# Patient Record
Sex: FEMALE | Race: WHITE | NOT HISPANIC OR LATINO | URBAN - METROPOLITAN AREA
[De-identification: names, ages, dates, MRNs, and addresses within clinical notes are randomized per-mention and may not be internally consistent; named-entity substitution may affect disease eponyms.]

---

## 2017-01-07 ENCOUNTER — OUTPATIENT (OUTPATIENT)
Dept: OUTPATIENT SERVICES | Facility: HOSPITAL | Age: 57
LOS: 1 days | Discharge: HOME | End: 2017-01-07

## 2017-06-27 DIAGNOSIS — M25.572 PAIN IN LEFT ANKLE AND JOINTS OF LEFT FOOT: ICD-10-CM

## 2018-12-15 ENCOUNTER — OUTPATIENT (OUTPATIENT)
Dept: OUTPATIENT SERVICES | Facility: HOSPITAL | Age: 58
LOS: 1 days | Discharge: HOME | End: 2018-12-15

## 2018-12-17 DIAGNOSIS — M81.0 AGE-RELATED OSTEOPOROSIS WITHOUT CURRENT PATHOLOGICAL FRACTURE: ICD-10-CM

## 2018-12-17 DIAGNOSIS — Z13.820 ENCOUNTER FOR SCREENING FOR OSTEOPOROSIS: ICD-10-CM

## 2018-12-17 DIAGNOSIS — Z78.0 ASYMPTOMATIC MENOPAUSAL STATE: ICD-10-CM

## 2019-01-08 ENCOUNTER — INPATIENT (INPATIENT)
Facility: HOSPITAL | Age: 59
LOS: 2 days | Discharge: HOME | End: 2019-01-11
Attending: INTERNAL MEDICINE | Admitting: INTERNAL MEDICINE

## 2019-01-08 VITALS
TEMPERATURE: 97 F | DIASTOLIC BLOOD PRESSURE: 69 MMHG | SYSTOLIC BLOOD PRESSURE: 113 MMHG | HEART RATE: 67 BPM | RESPIRATION RATE: 20 BRPM | OXYGEN SATURATION: 100 %

## 2019-01-08 LAB
ALBUMIN SERPL ELPH-MCNC: 4 G/DL — SIGNIFICANT CHANGE UP (ref 3.5–5.2)
ALP SERPL-CCNC: 248 U/L — HIGH (ref 30–115)
ALT FLD-CCNC: 840 U/L — HIGH (ref 0–41)
ANION GAP SERPL CALC-SCNC: 8 MMOL/L — SIGNIFICANT CHANGE UP (ref 7–14)
APPEARANCE UR: CLEAR — SIGNIFICANT CHANGE UP
AST SERPL-CCNC: 202 U/L — HIGH (ref 0–41)
BACTERIA # UR AUTO: SIGNIFICANT CHANGE UP /HPF
BASOPHILS # BLD AUTO: 0.01 K/UL — SIGNIFICANT CHANGE UP (ref 0–0.2)
BASOPHILS NFR BLD AUTO: 0.3 % — SIGNIFICANT CHANGE UP (ref 0–1)
BILIRUB SERPL-MCNC: 0.6 MG/DL — SIGNIFICANT CHANGE UP (ref 0.2–1.2)
BILIRUB UR-MCNC: ABNORMAL
BUN SERPL-MCNC: 16 MG/DL — SIGNIFICANT CHANGE UP (ref 10–20)
CALCIUM SERPL-MCNC: 9.4 MG/DL — SIGNIFICANT CHANGE UP (ref 8.5–10.1)
CHLORIDE SERPL-SCNC: 105 MMOL/L — SIGNIFICANT CHANGE UP (ref 98–110)
CO2 SERPL-SCNC: 28 MMOL/L — SIGNIFICANT CHANGE UP (ref 17–32)
COD CRY URNS QL: ABNORMAL
COLOR SPEC: YELLOW — SIGNIFICANT CHANGE UP
CREAT SERPL-MCNC: 0.8 MG/DL — SIGNIFICANT CHANGE UP (ref 0.7–1.5)
DIFF PNL FLD: ABNORMAL
EOSINOPHIL # BLD AUTO: 0.07 K/UL — SIGNIFICANT CHANGE UP (ref 0–0.7)
EOSINOPHIL NFR BLD AUTO: 2 % — SIGNIFICANT CHANGE UP (ref 0–8)
EPI CELLS # UR: ABNORMAL /HPF
ETHANOL SERPL-MCNC: <10 MG/DL — HIGH
GLUCOSE SERPL-MCNC: 102 MG/DL — HIGH (ref 70–99)
GLUCOSE UR QL: NEGATIVE MG/DL — SIGNIFICANT CHANGE UP
HCT VFR BLD CALC: 37.7 % — SIGNIFICANT CHANGE UP (ref 37–47)
HGB BLD-MCNC: 12.5 G/DL — SIGNIFICANT CHANGE UP (ref 12–16)
IMM GRANULOCYTES NFR BLD AUTO: 0.3 % — SIGNIFICANT CHANGE UP (ref 0.1–0.3)
KETONES UR-MCNC: NEGATIVE — SIGNIFICANT CHANGE UP
LACTATE SERPL-SCNC: 1 MMOL/L — SIGNIFICANT CHANGE UP (ref 0.5–2.2)
LEUKOCYTE ESTERASE UR-ACNC: ABNORMAL
LIDOCAIN IGE QN: 27 U/L — SIGNIFICANT CHANGE UP (ref 7–60)
LYMPHOCYTES # BLD AUTO: 0.99 K/UL — LOW (ref 1.2–3.4)
LYMPHOCYTES # BLD AUTO: 28.5 % — SIGNIFICANT CHANGE UP (ref 20.5–51.1)
MCHC RBC-ENTMCNC: 29.7 PG — SIGNIFICANT CHANGE UP (ref 27–31)
MCHC RBC-ENTMCNC: 33.2 G/DL — SIGNIFICANT CHANGE UP (ref 32–37)
MCV RBC AUTO: 89.5 FL — SIGNIFICANT CHANGE UP (ref 81–99)
MONOCYTES # BLD AUTO: 0.35 K/UL — SIGNIFICANT CHANGE UP (ref 0.1–0.6)
MONOCYTES NFR BLD AUTO: 10.1 % — HIGH (ref 1.7–9.3)
NEUTROPHILS # BLD AUTO: 2.04 K/UL — SIGNIFICANT CHANGE UP (ref 1.4–6.5)
NEUTROPHILS NFR BLD AUTO: 58.8 % — SIGNIFICANT CHANGE UP (ref 42.2–75.2)
NITRITE UR-MCNC: NEGATIVE — SIGNIFICANT CHANGE UP
NRBC # BLD: 0 /100 WBCS — SIGNIFICANT CHANGE UP (ref 0–0)
PH UR: 5.5 — SIGNIFICANT CHANGE UP (ref 5–8)
PLATELET # BLD AUTO: 176 K/UL — SIGNIFICANT CHANGE UP (ref 130–400)
POTASSIUM SERPL-MCNC: 4.3 MMOL/L — SIGNIFICANT CHANGE UP (ref 3.5–5)
POTASSIUM SERPL-SCNC: 4.3 MMOL/L — SIGNIFICANT CHANGE UP (ref 3.5–5)
PROT SERPL-MCNC: 7.2 G/DL — SIGNIFICANT CHANGE UP (ref 6–8)
PROT UR-MCNC: ABNORMAL MG/DL
RBC # BLD: 4.21 M/UL — SIGNIFICANT CHANGE UP (ref 4.2–5.4)
RBC # FLD: 13.2 % — SIGNIFICANT CHANGE UP (ref 11.5–14.5)
RBC CASTS # UR COMP ASSIST: ABNORMAL /HPF
SODIUM SERPL-SCNC: 141 MMOL/L — SIGNIFICANT CHANGE UP (ref 135–146)
SP GR SPEC: >=1.03 (ref 1.01–1.03)
UROBILINOGEN FLD QL: 1 MG/DL (ref 0.2–0.2)
WBC # BLD: 3.47 K/UL — LOW (ref 4.8–10.8)
WBC # FLD AUTO: 3.47 K/UL — LOW (ref 4.8–10.8)
WBC UR QL: ABNORMAL /HPF

## 2019-01-08 NOTE — ED PROVIDER NOTE - OBJECTIVE STATEMENT
This is a 58yF p/w epigastric pain x 1wk.  Pain is epigastric, radiating to her back, associated with nausea and 1 day of vomiting (when other people were sick as well after eating food on a boating trip). Pain started New Year's Niurka in the setting of heavier than usual alcohol intake over the holidays. No fevers but +chills.  No dysuria.  No hx gallstones.    PMH: chronic back pain  Meds: denies  NKDA  SH: +EtOH, nonsmoker

## 2019-01-08 NOTE — ED PROVIDER NOTE - PHYSICAL EXAMINATION
CONSTITUTIONAL: well developed; well nourished; well appearing in no acute distress  HEAD: normocephalic; atraumatic  EYES: no conjunctival injection, no scleral icterus  ENT: no nasal discharge; airway clear.  NECK: supple; non tender. + full passive ROM in all directions  CARD: S1, S2 normal; no murmurs, gallops, or rubs. Regular rate and rhythm  RESP: no wheezes, rales or rhonchi. Good air movement bilaterally without significant accessory muscle use  ABD: soft; non-distended; epigastric tenderness, no RUQ tenderness, no rebound, no guarding, no pulsatile abdominal mass  EXT: moving all extremities spontaneously, normal ROM. No clubbing, cyanosis or edema  SKIN: warm and dry, no lesions noted  NEURO: alert, oriented, CN II-XII grossly intact,motor and sensory grossly intact, speech nonslurred, no focal deficits. GCS 15  PSYCH: calm, cooperative, appropriate, good eye contact, logical thought process, no apparent danger to self or others

## 2019-01-08 NOTE — ED PROVIDER NOTE - MEDICAL DECISION MAKING DETAILS
58yF no sig PMH +EtOH p/w epigastric pain x1wk, sent to the ED for ? pancreatitis.  Labs with no leukocytosis, normal electrolytes and renal function, normal lipase, non-elevated lactate but  and  with normal bilirubin.  CT A/P w/o pancreatic pathology. RUQ US w/ cholelithiasis only.  Transaminitis not in alcoholic or cholestatic pattern, unclear etiology at this time.  Given less likely alcoholic hepatitis at this time, will not start steroids or pentoxifyline.  Pt hemodynamically stable, comfortable, to be admitted for hepatitis for further workup.

## 2019-01-08 NOTE — ED PROVIDER NOTE - NS ED ROS FT
Constitutional: no fevers/chills, no sick contacts  Eyes: No visual changes, eye pain or discharge. No photophobia  ENMT: No hearing changes, pain, discharge or infections. No sore throat or drooling.  Neck:  No neck pain or stiffness. No limited ROM  Cardiac: No SOB or edema. No chest pain with exertion.  Respiratory: No cough or respiratory distress. No hemoptysis. No history of asthma or RAD  GI: + nausea, vomiting, no diarrhea, +abdominal pain  : No dysuria, frequency or burning. No discharge  MS: No myalgia, muscle weakness, joint pain or back pain  Neuro: No headache or weakness. No LOC  Skin: No skin rash  Endo: no diabetes or thyroid dysfunction  Heme: no abnormal bleeding or clotting  Except as documented in the HPI, all other systems are negative

## 2019-01-09 RX ORDER — SODIUM CHLORIDE 9 MG/ML
1000 INJECTION, SOLUTION INTRAVENOUS ONCE
Qty: 0 | Refills: 0 | Status: COMPLETED | OUTPATIENT
Start: 2019-01-09 | End: 2019-01-09

## 2019-01-09 RX ORDER — PANTOPRAZOLE SODIUM 20 MG/1
40 TABLET, DELAYED RELEASE ORAL DAILY
Qty: 0 | Refills: 0 | Status: DISCONTINUED | OUTPATIENT
Start: 2019-01-09 | End: 2019-01-11

## 2019-01-09 RX ORDER — OXYCODONE HYDROCHLORIDE 5 MG/1
5 TABLET ORAL
Qty: 0 | Refills: 0 | Status: DISCONTINUED | OUTPATIENT
Start: 2019-01-09 | End: 2019-01-11

## 2019-01-09 RX ORDER — ACETAMINOPHEN 500 MG
650 TABLET ORAL EVERY 6 HOURS
Qty: 0 | Refills: 0 | Status: DISCONTINUED | OUTPATIENT
Start: 2019-01-09 | End: 2019-01-11

## 2019-01-09 RX ORDER — HEPARIN SODIUM 5000 [USP'U]/ML
5000 INJECTION INTRAVENOUS; SUBCUTANEOUS EVERY 8 HOURS
Qty: 0 | Refills: 0 | Status: DISCONTINUED | OUTPATIENT
Start: 2019-01-09 | End: 2019-01-11

## 2019-01-09 RX ORDER — SIMETHICONE 80 MG/1
80 TABLET, CHEWABLE ORAL DAILY
Qty: 0 | Refills: 0 | Status: DISCONTINUED | OUTPATIENT
Start: 2019-01-09 | End: 2019-01-11

## 2019-01-09 RX ADMIN — SODIUM CHLORIDE 1000 MILLILITER(S): 9 INJECTION, SOLUTION INTRAVENOUS at 01:21

## 2019-01-09 RX ADMIN — SIMETHICONE 80 MILLIGRAM(S): 80 TABLET, CHEWABLE ORAL at 12:49

## 2019-01-09 RX ADMIN — PANTOPRAZOLE SODIUM 40 MILLIGRAM(S): 20 TABLET, DELAYED RELEASE ORAL at 15:00

## 2019-01-09 NOTE — ED ADULT NURSE NOTE - NSIMPLEMENTINTERV_GEN_ALL_ED
Implemented All Universal Safety Interventions:  [place] to call system. Call bell, personal items and telephone within reach. Instruct patient to call for assistance. Room bathroom lighting operational. Non-slip footwear when patient is off stretcher. Physically safe environment: no spills, clutter or unnecessary equipment. Stretcher in lowest position, wheels locked, appropriate side rails in place.

## 2019-01-09 NOTE — CONSULT NOTE ADULT - SUBJECTIVE AND OBJECTIVE BOX
Chief complaint/Reason for consult: Elevated LFTs    HPI:58yFemale pmh epigastric pain for one week that is worse with food progressed to a 10/10 last night which prompted patient to come to the ER. Patient states pain is worse with food and located epigastrically with radiation to the back. Patient states it is associated with nausea and one episode of emesis yesterday. Patient notes she was on a boat recently where other people on the boat were sick as well. Patient denies diarrhea, constipation, hematemesis, melena, blood in stool. Patient has never had a colonoscopy or endoscopy and was scheduled for a GI appointment Friday. Patient has lost 25 pounds recently by using weight watchers. Patient notes she gets annual blood work and has never had elevated liver enzymes before. Patient is in a lot of pain at bedside requiring pain medication.     PMHX/PSHX: No significant past medical history          Family history:  FAMILY HISTORY:    No GI cancers in first or second degree relatives    Social History: No smoking. 2 glasses of wine every other weekend alcohol. No illegal drug use.    Allergies:  No Known Allergies      MEDICATIONS:MEDICATIONS  (STANDING):  heparin  Injectable 5000 Unit(s) SubCutaneous every 8 hours  simethicone 80 milliGRAM(s) Chew daily    MEDICATIONS  (PRN):  acetaminophen   Tablet .. 650 milliGRAM(s) Oral every 6 hours PRN Moderate Pain (4 - 6)  oxyCODONE    IR 5 milliGRAM(s) Oral four times a day PRN Severe Pain (7 - 10)          REVIEW OF SYSTEMS  General:  +25lb weight loss with weight watchers,No fevers, or chills.  Eyes:  No reported pain or visual changes  ENT:  No sore throat or runny nose.  NECK: No stiffness or lymphadenopathy  CV:  No chest pain or palpitations.  Resp:  No shortness of breath, cough, wheezing or hemoptysis  GI:  +epigastric abdominal pain, + nausea, +one episode emesis vomiting, No  dysphagia, diarrhea or constipation. No rectal bleeding, melena, or hematemesis.  Muscle:  No aches or weakness  Neuro:  No tingling, numbness       VITALS:   T(F): 98.1 (01-09-19 @ 13:52), Max: 98.7 (01-09-19 @ 05:41)  HR: 62 (01-09-19 @ 13:52) (62 - 71)  BP: 141/66 (01-09-19 @ 13:52) (104/59 - 141/66)  RR: 19 (01-09-19 @ 13:52) (16 - 20)  SpO2: 100% (01-09-19 @ 13:52) (98% - 100%)    PHYSICAL EXAM:  GENERAL: AAOx3, no acute distress.  HEAD:  Atraumatic, Normocephalic  EYES: conjunctiva and sclera clear  NECK: Supple, No thyromegaly   CHEST/LUNG: Clear to auscultation bilaterally; No wheeze, rhonchi, or rales  HEART: Regular rate and rhythm; normal S1, S2, No murmurs.  ABDOMEN: Soft, tender to palpation, nondistended; Bowel sounds present, no abdominal bruit, masses, or hepatosplenomegaly  EXTREMITIES:  2+ Peripheral Pulses. No clubbing, cyanosis, or edema, warm   NEUROLOGY: No asterixis or tremor  SKIN: Intact, no jaundice          LABS:  01-08    141  |  105  |  16  ----------------------------<  102<H>  4.3   |  28  |  0.8    Ca    9.4      08 Jan 2019 20:48    TPro  7.2  /  Alb  4.0  /  TBili  0.6  /  DBili  x   /  AST  202<H>  /  ALT  840<H>  /  AlkPhos  248<H>  01-08                          12.5   3.47  )-----------( 176      ( 08 Jan 2019 20:48 )             37.7     LIVER FUNCTIONS - ( 08 Jan 2019 20:48 )  Alb: 4.0 g/dL / Pro: 7.2 g/dL / ALK PHOS: 248 U/L / ALT: 840 U/L / AST: 202 U/L / GGT: x               IMAGING:  < from: US Abdomen Limited (01.08.19 @ 23:51) >  EXAM:  US ABDOMEN LIMITED            PROCEDURE DATE:  01/08/2019            INTERPRETATION:  CLINICAL HISTORY: Abdominal pain. Elevated LFTs.    COMPARISON: January 8, 2018 CT abdomen and pelvis.    PROCEDURE: Ultrasound of the right upper quadrantwas performed.    FINDINGS:    LIVER:  Normal in contour and echogenicity measuring 12.3 cm in length.   No focal mass.    GALLBLADDER/BILIARY TREE:  Cholelithiasis. No wall thickening or   pericholecystic fluid.  Negative sonographic Menezes's sign. No   intrahepatic biliary ductal dilatation. The common bile duct measures 6   mm, which is normal.     PANCREAS:  Visualized head and body are unremarkable. Remainder obscured   by overlying bowel gas.    KIDNEY:  Right kidney measures 11.9 cm in length. No hydronephrosis,   calculi or solid mass.    AORTA/IVC:  Visualized proximal portions unremarkable.    ASCITES:  None.    IMPRESSION:    Cholelithiasis; no sonographic evidence of cholecystitis.                  TANESHA REES M.D., ATTENDING RADIOLOGIST  This document has been electronically signed. Jan 9 2019 12:01AM        < end of copied text >    < from: CT Abdomen and Pelvis w/ IV Cont (01.08.19 @ 21:45) >    EXAM:  CT ABDOMEN AND PELVIS IC            PROCEDURE DATE:  01/08/2019            INTERPRETATION:  CLINICAL STATEMENT: Abdominal pain.      TECHNIQUE: Contiguous axial CT images were obtained from the lower chest   to the pubic symphysis following administration of 100cc Optiray 320   intravenous contrast.  Oral contrast was not administered.  Reformatted   images in the coronal and sagittal planes were acquired.    COMPARISON CT: None.    OTHER STUDIES USED FOR CORRELATION: None.       FINDINGS:    LOWER CHEST: Unremarkable.    HEPATOBILIARY: Unremarkable.    SPLEEN: Unremarkable.    PANCREAS: Unremarkable.    ADRENAL GLANDS: Unremarkable.    KIDNEYS: Subcentimeter left renal hypodensities, likely cysts.. No   hydronephrosis.    ABDOMINOPELVIC NODES: Unremarkable.    PELVIC ORGANS: Unremarkable.    PERITONEUM/MESENTERY/BOWEL: Trace free pelvic fluid; nonspecific. No   bowel obstruction. Normal appendix.    BONES/SOFT TISSUES: Unremarkable.    OTHER: Small fat-containing umbilical hernia.      IMPRESSION:   No evidence of acute intra-abdominal pathology.                  TANESHA REES M.D., ATTENDING RADIOLOGIST  This document has been electronically signed. Jan 8 2019 10:26PM    < end of copied text > Chief complaint/Reason for consult: Elevated LFTs    HPI:58yFemale pmh epigastric pain for one week that is worse with food progressed to a 10/10 last night which prompted patient to come to the ER. Patient states pain is worse with food and located epigastrically with radiation to the back. Patient states it is associated with nausea and one episode of emesis yesterday. Patient notes she was on a boat recently where other people on the boat were sick as well. Patient denies diarrhea, constipation, hematemesis, melena, blood in stool. Patient has never had a colonoscopy or endoscopy and was scheduled for a GI appointment Friday. Patient has lost 25 pounds recently by using weight watchers. Patient notes she gets annual blood work and has never had elevated liver enzymes before. Patient is in a lot of pain at bedside requiring pain medication.     PMHX/PSHX: No significant past medical history    FAMILY HISTORY:  No GI cancers in first or second degree relatives    Social History: No smoking. 2 glasses of wine every other weekend alcohol. No illegal drug use.    Allergies:  No Known Allergies      MEDICATIONS:MEDICATIONS  (STANDING):  heparin  Injectable 5000 Unit(s) SubCutaneous every 8 hours  simethicone 80 milliGRAM(s) Chew daily    MEDICATIONS  (PRN):  acetaminophen   Tablet .. 650 milliGRAM(s) Oral every 6 hours PRN Moderate Pain (4 - 6)  oxyCODONE    IR 5 milliGRAM(s) Oral four times a day PRN Severe Pain (7 - 10)          REVIEW OF SYSTEMS  General:  +25lb weight loss with weight watchers,No fevers, or chills.  Eyes:  No reported pain or visual changes  ENT:  No sore throat or runny nose.  NECK: No stiffness or lymphadenopathy  CV:  No chest pain or palpitations.  Resp:  No shortness of breath, cough, wheezing or hemoptysis  GI:  +epigastric abdominal pain, + nausea, +one episode emesis vomiting, No  dysphagia, diarrhea or constipation. No rectal bleeding, melena, or hematemesis.  Muscle:  No aches or weakness  Neuro:  No tingling, numbness       VITALS:   T(F): 98.1 (01-09-19 @ 13:52), Max: 98.7 (01-09-19 @ 05:41)  HR: 62 (01-09-19 @ 13:52) (62 - 71)  BP: 141/66 (01-09-19 @ 13:52) (104/59 - 141/66)  RR: 19 (01-09-19 @ 13:52) (16 - 20)  SpO2: 100% (01-09-19 @ 13:52) (98% - 100%)    PHYSICAL EXAM:  GENERAL: AAOx3, no acute distress.  HEAD:  Atraumatic, Normocephalic  EYES: conjunctiva and sclera clear  NECK: Supple, No thyromegaly   CHEST/LUNG: Clear to auscultation bilaterally; No wheeze, rhonchi, or rales  HEART: Regular rate and rhythm; normal S1, S2, No murmurs.  ABDOMEN: Soft, tender to palpation, nondistended; Bowel sounds present, no abdominal bruit, masses, or hepatosplenomegaly  EXTREMITIES:  2+ Peripheral Pulses. No clubbing, cyanosis, or edema, warm   NEUROLOGY: No asterixis or tremor  SKIN: Intact, no jaundice          LABS:  01-08    141  |  105  |  16  ----------------------------<  102<H>  4.3   |  28  |  0.8    Ca    9.4      08 Jan 2019 20:48    TPro  7.2  /  Alb  4.0  /  TBili  0.6  /  DBili  x   /  AST  202<H>  /  ALT  840<H>  /  AlkPhos  248<H>  01-08                          12.5   3.47  )-----------( 176      ( 08 Jan 2019 20:48 )             37.7     LIVER FUNCTIONS - ( 08 Jan 2019 20:48 )  Alb: 4.0 g/dL / Pro: 7.2 g/dL / ALK PHOS: 248 U/L / ALT: 840 U/L / AST: 202 U/L / GGT: x               IMAGING:  < from: US Abdomen Limited (01.08.19 @ 23:51) >  EXAM:  US ABDOMEN LIMITED            PROCEDURE DATE:  01/08/2019            INTERPRETATION:  CLINICAL HISTORY: Abdominal pain. Elevated LFTs.    COMPARISON: January 8, 2018 CT abdomen and pelvis.    PROCEDURE: Ultrasound of the right upper quadrantwas performed.    FINDINGS:    LIVER:  Normal in contour and echogenicity measuring 12.3 cm in length.   No focal mass.    GALLBLADDER/BILIARY TREE:  Cholelithiasis. No wall thickening or   pericholecystic fluid.  Negative sonographic Menezes's sign. No   intrahepatic biliary ductal dilatation. The common bile duct measures 6   mm, which is normal.     PANCREAS:  Visualized head and body are unremarkable. Remainder obscured   by overlying bowel gas.    KIDNEY:  Right kidney measures 11.9 cm in length. No hydronephrosis,   calculi or solid mass.    AORTA/IVC:  Visualized proximal portions unremarkable.    ASCITES:  None.    IMPRESSION:    Cholelithiasis; no sonographic evidence of cholecystitis.                  TANESHA REES M.D., ATTENDING RADIOLOGIST  This document has been electronically signed. Jan 9 2019 12:01AM        < end of copied text >    < from: CT Abdomen and Pelvis w/ IV Cont (01.08.19 @ 21:45) >    EXAM:  CT ABDOMEN AND PELVIS IC            PROCEDURE DATE:  01/08/2019            INTERPRETATION:  CLINICAL STATEMENT: Abdominal pain.      TECHNIQUE: Contiguous axial CT images were obtained from the lower chest   to the pubic symphysis following administration of 100cc Optiray 320   intravenous contrast.  Oral contrast was not administered.  Reformatted   images in the coronal and sagittal planes were acquired.    COMPARISON CT: None.    OTHER STUDIES USED FOR CORRELATION: None.       FINDINGS:    LOWER CHEST: Unremarkable.    HEPATOBILIARY: Unremarkable.    SPLEEN: Unremarkable.    PANCREAS: Unremarkable.    ADRENAL GLANDS: Unremarkable.    KIDNEYS: Subcentimeter left renal hypodensities, likely cysts.. No   hydronephrosis.    ABDOMINOPELVIC NODES: Unremarkable.    PELVIC ORGANS: Unremarkable.    PERITONEUM/MESENTERY/BOWEL: Trace free pelvic fluid; nonspecific. No   bowel obstruction. Normal appendix.    BONES/SOFT TISSUES: Unremarkable.    OTHER: Small fat-containing umbilical hernia.      IMPRESSION:   No evidence of acute intra-abdominal pathology.                  TANESHA REES M.D., ATTENDING RADIOLOGIST  This document has been electronically signed. Jan 8 2019 10:26PM    < end of copied text >

## 2019-01-09 NOTE — CONSULT NOTE ADULT - ASSESSMENT
58yFemale pmh epigastric pain for one week that is worse with food progressed to a 10/10 last night which prompted patient to come to the ER    Problem 1-Epigastric Pain with Elevated LFTs likely billary from passed stone  Rec  -Patient will need MRCP if MRCP shows choledocholithiasis patient will need ERCP and patient can go to [facility] for ERCP and come back to [facility] after the procedure.  -Pain management  -Clear liquids   -GI ppx with PPI 40mg daily   -Trend LFTS  -AM CMP, CBC, AM PT,PTT,INR   -Will follow up 58yFemale pmh epigastric pain for one week that is worse with food progressed to a 10/10 last night which prompted patient to come to the ER    Problem 1-Epigastric Pain with Elevated LFTs likely billary from passed stone  Rec  -Patient will need MRCP if MRCP shows choledocholithiasis patient will need ERCP and patient can go to [facility] for ERCP and come back to [facility] after the procedure.  -Patient should have surgery consult for possible cholecystectomy   -Pain management  -Clear liquids   -GI ppx with PPI 40mg daily   -Trend LFTS  -AM CMP, CBC, AM PT,PTT,INR   -Will follow up 58yFemale pmh epigastric pain for one week that is worse with food progressed to a 10/10 last night which prompted patient to come to the ER    Problem 1-Epigastric Pain with Elevated LFTs likely billary colic with possible passed CBD stone  Rec  -Patient will need MRCP if MRCP shows choledocholithiasis patient will need ERCP and patient can go to [facility] for ERCP and come back to [facility] after the procedure.  -Patient should have surgery consult for lap cholecystectomy in the near future  -Pain management  -Clear liquids   -GI ppx with PPI 40mg daily   -Trend LFTS  -AM CMP, CBC, AM PT,PTT,INR   -Will follow up

## 2019-01-09 NOTE — H&P ADULT - ASSESSMENT
58F no PMHx here with epigastric pain, noted to have elevated liver injury in hepatocellular pattern.    1. Elevated liver enzymes  unclear etiology  check hepatitis panel  check anti smooth muscle, anti mitochondrial  check HIV  GI consult  2. Epigastric pain  CT neg  lipase neg  start simethicone  unclear etiology  physical exam nontender  3. DVT ppx  subq hep

## 2019-01-09 NOTE — PATIENT PROFILE ADULT - HAS THE PATIENT RECEIVED THE INFLUENZA VACCINE THIS SEASON?
Replied to patient via [facility]. Initially tried tizanidine which was not helpful. She did establish with PT, but next appointment isn't for another nine days. I did provide short course of 12 tabs hydrocodone/acetaminophen 5/325mg to use as-needed due to severity of neck pain and lack of relief with muscle relaxers. Ideally this is not long-term solution. Should we try to get her in with pain management?   yes...

## 2019-01-09 NOTE — H&P ADULT - BACK
PROCEDURE:  CT brain without contrast

 

CLINICAL INDICATION:  Fall, head pain 

 

TECHNIQUE:  CT of the brain without contrast was performed on a multidetector CT scanner, with multi
planar reformats.  One or more of the following dose reduction techniques were used: Automated expos
ure control, adjustment in mA and / or kV according to patient size, use of iterative reconstructive
 technique. CTDIvol = 17 mGy; DLP = 274 mGy-cm. 

 

COMPARISON:   None available 

 

FINDINGS:

There is a right parietal scalp hematoma without underlying fracture identified.  No acute intracran
ial hemorrhage is identified.  No extra-axial fluid collection is seen.

 

There is no mass effect.  No midline shift is identified.

 

Ventricles and sulci are within normal limits for size and configuration.  

 

The density of the brain is within normal limits.  Gray-white differentiation is preserved.  

 

Osseous structures are unremarkable.  A very small polypoid opacity is noted in the right maxillary 
sinus.   

 

 

IMPRESSION:

Right parietal scalp hematoma, without underlying fracture, or evidence of acute intracranial pathol
ogy.

 

RPTAT: HESO

_____________________________________________ 

.Juwan Sarah MD, MD           Date    Time 

Electronically viewed and signed by .Juwan Sarah MD, MD on 04/14/2017 19:50 

 

D:  04/14/2017 19:50  T:  04/14/2017 19:50

.O/
detailed exam

## 2019-01-09 NOTE — H&P ADULT - HISTORY OF PRESENT ILLNESS
58F no PMHx here with epigastric pain. She states her pain developed on New Years day, described as epigastric, radiating to back. Never had this before. Unclear if worsened with po, but unchanged with BM. No changes in bowel habits. Has nausea, and NBNB vomiting x1. She was on a boat for 5 hours on New Years and other passengers have been vomiting as well (2 days after trip). Does not remember food but no raw food. Drinks alcohol socially. No drug abuse. Has no history of liver injury.

## 2019-01-10 LAB
ALBUMIN SERPL ELPH-MCNC: 4.2 G/DL — SIGNIFICANT CHANGE UP (ref 3.5–5.2)
ALP SERPL-CCNC: 337 U/L — HIGH (ref 30–115)
ALT FLD-CCNC: 871 U/L — HIGH (ref 0–41)
ANION GAP SERPL CALC-SCNC: 9 MMOL/L — SIGNIFICANT CHANGE UP (ref 7–14)
AST SERPL-CCNC: 338 U/L — HIGH (ref 0–41)
BILIRUB SERPL-MCNC: 0.9 MG/DL — SIGNIFICANT CHANGE UP (ref 0.2–1.2)
BUN SERPL-MCNC: 9 MG/DL — LOW (ref 10–20)
CALCIUM SERPL-MCNC: 9.8 MG/DL — SIGNIFICANT CHANGE UP (ref 8.5–10.1)
CHLORIDE SERPL-SCNC: 109 MMOL/L — SIGNIFICANT CHANGE UP (ref 98–110)
CO2 SERPL-SCNC: 28 MMOL/L — SIGNIFICANT CHANGE UP (ref 17–32)
CREAT SERPL-MCNC: 0.8 MG/DL — SIGNIFICANT CHANGE UP (ref 0.7–1.5)
GLUCOSE SERPL-MCNC: 96 MG/DL — SIGNIFICANT CHANGE UP (ref 70–99)
HAV IGM SER-ACNC: SIGNIFICANT CHANGE UP
HBV CORE IGM SER-ACNC: SIGNIFICANT CHANGE UP
HBV SURFACE AG SER-ACNC: SIGNIFICANT CHANGE UP
HCT VFR BLD CALC: 43 % — SIGNIFICANT CHANGE UP (ref 37–47)
HCV AB S/CO SERPL IA: 0.1 S/CO — SIGNIFICANT CHANGE UP
HCV AB SERPL-IMP: SIGNIFICANT CHANGE UP
HGB BLD-MCNC: 14.1 G/DL — SIGNIFICANT CHANGE UP (ref 12–16)
MAGNESIUM SERPL-MCNC: 2.3 MG/DL — SIGNIFICANT CHANGE UP (ref 1.8–2.4)
MCHC RBC-ENTMCNC: 29.4 PG — SIGNIFICANT CHANGE UP (ref 27–31)
MCHC RBC-ENTMCNC: 32.8 G/DL — SIGNIFICANT CHANGE UP (ref 32–37)
MCV RBC AUTO: 89.8 FL — SIGNIFICANT CHANGE UP (ref 81–99)
MITOCHONDRIA AB SER-ACNC: SIGNIFICANT CHANGE UP
NRBC # BLD: 0 /100 WBCS — SIGNIFICANT CHANGE UP (ref 0–0)
PHOSPHATE SERPL-MCNC: 4.3 MG/DL — SIGNIFICANT CHANGE UP (ref 2.1–4.9)
PLATELET # BLD AUTO: 244 K/UL — SIGNIFICANT CHANGE UP (ref 130–400)
POTASSIUM SERPL-MCNC: 4.5 MMOL/L — SIGNIFICANT CHANGE UP (ref 3.5–5)
POTASSIUM SERPL-SCNC: 4.5 MMOL/L — SIGNIFICANT CHANGE UP (ref 3.5–5)
PROT SERPL-MCNC: 7.1 G/DL — SIGNIFICANT CHANGE UP (ref 6–8)
RBC # BLD: 4.79 M/UL — SIGNIFICANT CHANGE UP (ref 4.2–5.4)
RBC # FLD: 12.7 % — SIGNIFICANT CHANGE UP (ref 11.5–14.5)
SMOOTH MUSCLE AB SER-ACNC: SIGNIFICANT CHANGE UP
SODIUM SERPL-SCNC: 146 MMOL/L — SIGNIFICANT CHANGE UP (ref 135–146)
WBC # BLD: 4.92 K/UL — SIGNIFICANT CHANGE UP (ref 4.8–10.8)
WBC # FLD AUTO: 4.92 K/UL — SIGNIFICANT CHANGE UP (ref 4.8–10.8)

## 2019-01-10 RX ADMIN — PANTOPRAZOLE SODIUM 40 MILLIGRAM(S): 20 TABLET, DELAYED RELEASE ORAL at 11:43

## 2019-01-10 NOTE — PROGRESS NOTE ADULT - ASSESSMENT
59 yo F with no PMHx presented for epigastric pain x10 days associated with nausea, and NBNB vomiting x1.     Epigastric Pain with transaminitis possibly 2/2 billary colic with possible passed CBD stone, r/o choledocholithiasis  - CT abdo/pelvis neg for acute pathology  - abdo US shows cholelithiasis   - check MRCP if MRCP shows choledocholithiasis patient will need ERCP  - will consider surgery consult for lap cholecystectomy pending imaging results  - c/w CLD for now   - c/w PPI 40mg daily   - hepatitis panel neg  - anti smooth muscle and anti mitochondrial pending  - LFTs rising, continue to monitor   - GI recs appreciated    DVT ppx  - HSQ    Dispo: from home

## 2019-01-10 NOTE — PROGRESS NOTE ADULT - SUBJECTIVE AND OBJECTIVE BOX
PALADINO, GRACE  58y, Female  Allergy: No Known Allergies    Hospital Day: 1d    Patient seen and examined earlier today. LFTs increasing. Denies abdominal pain, N/V, fevers or chills. MRCP today.    PMH/PSH:  PAST MEDICAL & SURGICAL HISTORY:        VITALS:  T(F): 97.4 (01-10-19 @ 05:15), Max: 98.8 (01-09-19 @ 16:21)  HR: 51 (01-10-19 @ 05:15)  BP: 111/56 (01-10-19 @ 05:15) (111/56 - 141/66)  RR: 18 (01-10-19 @ 05:15)  SpO2: 100% (01-09-19 @ 13:52)    TESTS & MEASUREMENTS:  Weight (Kg): 68.6 (01-09-19 @ 16:21)  BMI (kg/m2): 27.7 (01-09)                          14.1   4.92  )-----------( 244      ( 10 Ernst 2019 08:01 )             43.0       01-10    146  |  109  |  9<L>  ----------------------------<  96  4.5   |  28  |  0.8    Ca    9.8      10 Ernst 2019 08:01  Phos  4.3     01-10  Mg     2.3     01-10    TPro  7.1  /  Alb  4.2  /  TBili  0.9  /  DBili  x   /  AST  338<H>  /  ALT  871<H>  /  AlkPhos  337<H>  01-10    LIVER FUNCTIONS - ( 10 Ernst 2019 08:01 )  Alb: 4.2 g/dL / Pro: 7.1 g/dL / ALK PHOS: 337 U/L / ALT: 871 U/L / AST: 338 U/L / GGT: x                 Urinalysis Basic - ( 08 Jan 2019 20:48 )    Color: Yellow / Appearance: Clear / SG: >=1.030 / pH: x  Gluc: x / Ketone: Negative  / Bili: Small / Urobili: 1.0 mg/dL   Blood: x / Protein: Trace mg/dL / Nitrite: Negative   Leuk Esterase: Small / RBC: 6-10 /HPF / WBC 26-50 /HPF   Sq Epi: x / Non Sq Epi: Occasional /HPF / Bacteria: Occasional /HPF      RECENT DIAGNOSTIC ORDERS:  Diet, Clear Liquid (01-09-19 @ 13:56)  MR Abdomen No Cont: 13:52 (01-10-19 @ 09:22)      MEDICATIONS:  MEDICATIONS  (STANDING):  heparin  Injectable 5000 Unit(s) SubCutaneous every 8 hours  pantoprazole    Tablet 40 milliGRAM(s) Oral daily  simethicone 80 milliGRAM(s) Chew daily    MEDICATIONS  (PRN):  acetaminophen   Tablet .. 650 milliGRAM(s) Oral every 6 hours PRN Moderate Pain (4 - 6)  oxyCODONE    IR 5 milliGRAM(s) Oral four times a day PRN Severe Pain (7 - 10)      HOME MEDICATIONS:      PHYSICAL EXAM:  GENERAL: middle age female, in NAD, well-developed  HEAD:  Atraumatic, Normocephalic  EYES: EOMI, PERRLA  NECK: Supple, No JVD  CHEST/LUNG: Clear to auscultation bilaterally; No wheeze  HEART: Regular rate and rhythm; No murmurs, rubs, or gallops  ABDOMEN: Soft, Nontender, Nondistended; Bowel sounds present  EXTREMITIES:  2+ Peripheral Pulses, No clubbing, cyanosis, or edema  PSYCH: AAOx3  NEUROLOGY: non-focal

## 2019-01-11 ENCOUNTER — TRANSCRIPTION ENCOUNTER (OUTPATIENT)
Age: 59
End: 2019-01-11

## 2019-01-11 VITALS
DIASTOLIC BLOOD PRESSURE: 55 MMHG | SYSTOLIC BLOOD PRESSURE: 113 MMHG | RESPIRATION RATE: 16 BRPM | TEMPERATURE: 97 F | HEART RATE: 63 BPM

## 2019-01-11 LAB
ALBUMIN SERPL ELPH-MCNC: 3.8 G/DL — SIGNIFICANT CHANGE UP (ref 3.5–5.2)
ALP SERPL-CCNC: 268 U/L — HIGH (ref 30–115)
ALT FLD-CCNC: 587 U/L — HIGH (ref 0–41)
ANION GAP SERPL CALC-SCNC: 10 MMOL/L — SIGNIFICANT CHANGE UP (ref 7–14)
AST SERPL-CCNC: 115 U/L — HIGH (ref 0–41)
BILIRUB SERPL-MCNC: 0.7 MG/DL — SIGNIFICANT CHANGE UP (ref 0.2–1.2)
BUN SERPL-MCNC: 10 MG/DL — SIGNIFICANT CHANGE UP (ref 10–20)
CALCIUM SERPL-MCNC: 9.8 MG/DL — SIGNIFICANT CHANGE UP (ref 8.5–10.1)
CHLORIDE SERPL-SCNC: 107 MMOL/L — SIGNIFICANT CHANGE UP (ref 98–110)
CO2 SERPL-SCNC: 29 MMOL/L — SIGNIFICANT CHANGE UP (ref 17–32)
CREAT SERPL-MCNC: 0.7 MG/DL — SIGNIFICANT CHANGE UP (ref 0.7–1.5)
GLUCOSE SERPL-MCNC: 87 MG/DL — SIGNIFICANT CHANGE UP (ref 70–99)
HCT VFR BLD CALC: 41.2 % — SIGNIFICANT CHANGE UP (ref 37–47)
HGB BLD-MCNC: 13.3 G/DL — SIGNIFICANT CHANGE UP (ref 12–16)
MCHC RBC-ENTMCNC: 28.8 PG — SIGNIFICANT CHANGE UP (ref 27–31)
MCHC RBC-ENTMCNC: 32.3 G/DL — SIGNIFICANT CHANGE UP (ref 32–37)
MCV RBC AUTO: 89.2 FL — SIGNIFICANT CHANGE UP (ref 81–99)
NRBC # BLD: 0 /100 WBCS — SIGNIFICANT CHANGE UP (ref 0–0)
PLATELET # BLD AUTO: 217 K/UL — SIGNIFICANT CHANGE UP (ref 130–400)
POTASSIUM SERPL-MCNC: 5 MMOL/L — SIGNIFICANT CHANGE UP (ref 3.5–5)
POTASSIUM SERPL-SCNC: 5 MMOL/L — SIGNIFICANT CHANGE UP (ref 3.5–5)
PROT SERPL-MCNC: 6.6 G/DL — SIGNIFICANT CHANGE UP (ref 6–8)
RBC # BLD: 4.62 M/UL — SIGNIFICANT CHANGE UP (ref 4.2–5.4)
RBC # FLD: 12.6 % — SIGNIFICANT CHANGE UP (ref 11.5–14.5)
SODIUM SERPL-SCNC: 146 MMOL/L — SIGNIFICANT CHANGE UP (ref 135–146)
WBC # BLD: 4.84 K/UL — SIGNIFICANT CHANGE UP (ref 4.8–10.8)
WBC # FLD AUTO: 4.84 K/UL — SIGNIFICANT CHANGE UP (ref 4.8–10.8)

## 2019-01-11 RX ORDER — PANTOPRAZOLE SODIUM 20 MG/1
1 TABLET, DELAYED RELEASE ORAL
Qty: 30 | Refills: 0 | OUTPATIENT
Start: 2019-01-11 | End: 2019-02-09

## 2019-01-11 RX ORDER — PANTOPRAZOLE SODIUM 20 MG/1
1 TABLET, DELAYED RELEASE ORAL
Qty: 0 | Refills: 0 | COMMUNITY
Start: 2019-01-11

## 2019-01-11 RX ADMIN — Medication 650 MILLIGRAM(S): at 11:47

## 2019-01-11 RX ADMIN — SIMETHICONE 80 MILLIGRAM(S): 80 TABLET, CHEWABLE ORAL at 11:49

## 2019-01-11 RX ADMIN — PANTOPRAZOLE SODIUM 40 MILLIGRAM(S): 20 TABLET, DELAYED RELEASE ORAL at 11:49

## 2019-01-11 RX ADMIN — Medication 650 MILLIGRAM(S): at 08:15

## 2019-01-11 NOTE — DISCHARGE NOTE ADULT - PROVIDER TOKENS
FREE:[LAST:[geris],PHONE:[(   )    -],FAX:[(   )    -]],FREE:[LAST:[Stephen],FIRST:[Geris],PHONE:[(   )    -],FAX:[(   )    -],ADDRESS:[[address] [place] [address]  Phone: [phone]  [facility]]],TOKEN:'[id number]'

## 2019-01-11 NOTE — CHART NOTE - NSCHARTNOTEFT_GEN_A_CORE
Subjective: pt seen and examined at bedside. No acute overnight events. Denies abdominal pain, N/V/D or fevers.    Vital Signs Last 24 Hrs  T(C): 36.2 (11 Jan 2019 05:36), Max: 36.8 (10 Ernst 2019 17:30)  T(F): 97.2 (11 Jan 2019 05:36), Max: 98.3 (10 Ernst 2019 17:30)  HR: 63 (11 Jan 2019 05:36) (63 - 66)  BP: 113/55 (11 Jan 2019 05:36) (113/55 - 148/68)  BP(mean): --  RR: 16 (11 Jan 2019 05:36) (16 - 18)  SpO2: --  PHYSICAL EXAM:    Constitutional: NAD, awake and alert, well-developed  Respiratory: Breath sounds are clear bilaterally, No wheezing, rales or rhonchi  Cardiovascular: S1 and S2, regular rate and rhythm, no Murmurs, gallops or rubs  Gastrointestinal: Bowel Sounds present, soft, nontender, nondistended, no guarding, no rebound  Extremities: No peripheral edema  Vascular: 2+ peripheral pulses  Neurological: A/O x 3, no focal deficits  Musculoskeletal: 5/5 strength b/l upper and lower extremities      MEDICATIONS:  MEDICATIONS  (STANDING):  heparin  Injectable 5000 Unit(s) SubCutaneous every 8 hours  pantoprazole    Tablet 40 milliGRAM(s) Oral daily  simethicone 80 milliGRAM(s) Chew daily      LABS: All Labs Reviewed:                        13.3   4.84  )-----------( 217      ( 11 Jan 2019 09:17 )             41.2     01-11    146  |  107  |  10  ----------------------------<  87  5.0   |  29  |  0.7    Ca    9.8      11 Jan 2019 09:17  Phos  4.3     01-10  Mg     2.3     01-10    TPro  6.6  /  Alb  3.8  /  TBili  0.7  /  DBili  x   /  AST  115<H>  /  ALT  587<H>  /  AlkPhos  268<H>  01-11      57 yo F with no PMHx presented for epigastric pain x10 days associated with nausea, and NBNB vomiting x1. Epigastric Pain with transaminitis 2/2 choledocholithiasis. CT abdo/pelvis neg for acute pathology. Abdo US shows cholelithiasis. MRCP showed Dilated common bile duct measuring 8 mm and ends abruptly at a prominent ampulla measuring 1 cm. An obstructing process is not excluded and further evaluation with ERCP is recommended. Pt will f/u with GI as outpt and will need will follow-up with surgical team outpatient to have laparoscopic cholecystectomy. Cleared for d/c home per GI.     Epigastric Pain with transaminitis 2/2 choledocholithiasis  - CT abdo/pelvis neg for acute pathology  - abdo US shows cholelithiasis   - < from: MR Abdomen No Cont (01.10.19 @ 16:01) >Dilated common bile duct measuring 8 mm and ends abruptly at a prominent   ampulla measuring 1 cm. An obstructing process is not excluded and further evaluation with ERCP is recommended. Nonobstructing 3 mm distal common bile duct stone with debris. Cholelithiasis.  - c/w PPI 40mg daily   - f/u with GI as outpt and will need will follow-up with surgical team outpatient to have laparoscopic cholecystectomy  - Cleared for d/c home per GI    DVT ppx  - HSQ    Dispo: from home, stable for d/c home today    >35 mins spent on patients care today

## 2019-01-11 NOTE — PROGRESS NOTE ADULT - ASSESSMENT
58yFemale pmh epigastric pain for one week that is worse with food progressed to a 10/10 last night which prompted patient to come to the ER patient no longer has pain and is not requiring pain medication.    Problem 1-Choledocolithiasis   Rec  -Pt will need ERCP that can be scheduled outpatient in our office [phone] with Dr. Chris, Dr. Conti, or Dr. Pedraza at [address]   -GI ppx with PPI 40mg daily       Problem 2-Cholelithiasis   Rec  -Patient will follow-up with surgical team outpatient to have laparoscopic cholecystectomy 58yFemale pmh epigastric pain for one week that is worse with food progressed to a 10/10 last night which prompted patient to come to the ER patient no longer has pain and is not requiring pain medication.    Problem 1-Choledocolithiasis   Rec  -Pt will need ERCP that can be scheduled outpatient in our office [phone] with Dr. Chris, Dr. Conti, or Dr[address] Ped[address]za [address]   -Patient has scheduled appointment with our office already  -GI ppx with PPI 40mg daily       Problem 2-Cholelithiasis   Rec  -Patient will follow-up with surgical team outpatient to have laparoscopic cholecystectomy 58yFemale pmh epigastric pain for one week that is worse with food progressed to a 10/10 last night which prompted patient to come to the ER patient no longer has pain and is not requiring pain medication.    Problem 1-Choledocolithiasis   Rec  -Pt will need ERCP that can be scheduled outpatient in our office [phone] with Dr. Chris, Dr. Conti, or Dr. Pedraza at [address] [place] [address] (this information was given to the patient)  -Patient has scheduled appointment with our office already  -GI ppx with PPI 40mg daily       Problem 2-Cholelithiasis   Rec  -Patient will follow-up with surgical team outpatient to have laparoscopic cholecystectomy

## 2019-01-11 NOTE — DISCHARGE NOTE ADULT - CARE PLAN
Principal Discharge DX:	Epigastric pain  Goal:	evaluate cause of epigastric pain --> due to Choledocolithiasis  Assessment and plan of treatment:	Epigastric Pain with transaminitis 2/2 choledocholithiasis. CT abdo/pelvis neg for acute pathology. Abdo US showed cholelithiasis. MRCP showed Dilated common bile duct measuring 8 mm and ends abruptly at a prominent ampulla measuring 1 cm. An obstructing process is not excluded and further evaluation with ERCP is recommended. Pt will f/u with GI as outpt and will need will follow-up with surgical team outpatient to have laparoscopic cholecystectomy

## 2019-01-11 NOTE — PROGRESS NOTE ADULT - SUBJECTIVE AND OBJECTIVE BOX
58yFemale  Being followed for   Interval history:      PMHX/PSHX:   Chronic Back pain        Social History: No smoking. No alcohol. No illegal drug use.          MEDICATIONS:MEDICATIONS  (STANDING):  heparin  Injectable 5000 Unit(s) SubCutaneous every 8 hours  pantoprazole    Tablet 40 milliGRAM(s) Oral daily  simethicone 80 milliGRAM(s) Chew daily    MEDICATIONS  (PRN):  acetaminophen   Tablet .. 650 milliGRAM(s) Oral every 6 hours PRN Moderate Pain (4 - 6)  oxyCODONE    IR 5 milliGRAM(s) Oral four times a day PRN Severe Pain (7 - 10)        Allergies:    No Known Allergies    Intolerances          REVIEW OF SYSTEMS:  General:  No weight loss, fevers, or chills.  Eyes:  No reported pain or visual changes  ENT:  No sore throat or runny nose.  NECK: No stiffness   CV:  No chest pain or palpitations.  Resp:  No shortness of breath, cough  GI:  No abdominal pain, nausea, vomiting, dysphagia, diarrhea or constipation. No rectal bleeding, melena, or hematemesis.  Neuro:  No tingling, numbness           VITAL SIGNS:   T(F): 97.2 (01-11-19 @ 05:36), Max: 98.3 (01-10-19 @ 17:30)  HR: 63 (01-11-19 @ 05:36) (63 - 66)  BP: 113/55 (01-11-19 @ 05:36) (113/55 - 148/68)  RR: 16 (01-11-19 @ 05:36) (16 - 18)  SpO2: --    PHYSICAL EXAM:  GENERAL: AAOx3, no acute distress.  HEAD:  Atraumatic, Normocephalic  EYES: conjunctiva and sclera clear  NECK: Supple, no JVD or thyromegaly  CHEST/LUNG: Clear to auscultation bilaterally; No wheeze, rhonchi, or rales  HEART: Regular rate and rhythm; normal S1, S2, No murmurs.  ABDOMEN: Soft, nontender, nondistended; Bowel sounds present, no abdominal bruit, masses, or hepatosplenomegaly  NEUROLOGY: No asterixis or tremor.   SKIN: Intact, no jaundice            LABS:                        13.3   4.84  )-----------( 217      ( 11 Jan 2019 09:17 )             41.2     01-11    146  |  107  |  10  ----------------------------<  87  5.0   |  29  |  0.7    Ca    9.8      11 Jan 2019 09:17  Phos  4.3     01-10  Mg     2.3     01-10    TPro  6.6  /  Alb  3.8  /  TBili  0.7  /  DBili  x   /  AST  115<H>  /  ALT  587<H>  /  AlkPhos  268<H>  01-11    LIVER FUNCTIONS - ( 11 Jan 2019 09:17 )  Alb: 3.8 g/dL / Pro: 6.6 g/dL / ALK PHOS: 268 U/L / ALT: 587 U/L / AST: 115 U/L / GGT: x               IMAGING:  < from: MR Abdomen No Cont (01.10.19 @ 16:01) >  EXAM:  MR ABDOMEN          *** ADDENDUM 01/10/2019  ***    Dr. Olivera was made aware of the above findings on January 10, 2019 at   5:15 PM with read back       *** END OF ADDENDUM 01/10/2019  ***        PROCEDURE DATE:  01/10/2019            INTERPRETATION:  Clinical History / Reason for exam: Elevated liver   function tests.    Multiplanar multi sequential MRI of the biliary tree was performed   without contrast and compared to abdominal pelvic CT and ultrasound dated   January 8, 2019.    The common bile duct is dilated to 8 mm and ends abruptly at a prominent   ampulla of Vater measuring 1 cm. An obstructing process is not excluded   and further evaluation with ERCP is recommended. There is a   nonobstructing 3 mm distal common bile duct stone with debris. This is   best seen on image 31 of series 7. The gallbladder is present with few   stones. At the fundus of the gallbladder, there are findings consistent   with adenomyomatosis. There is a tiny hepatic cyst in the left liver.     The pancreas is unremarkable. The pancreatic duct is unremarkable. The   spleen and adrenal glands are unremarkable.    There are bilateral renal cysts. There is no hydronephrosis.     There is no abdominal adenopathy.    Impression    Dilated common bile duct measuring 8 mm and ends abruptly at a prominent   ampulla measuring 1 cm. An obstructing process is not excluded and   further evaluation with ERCP is recommended.    Nonobstructing 3 mm distal common bile duct stone with debris.    Cholelithiasis.          ***Please see the addendum at the top of this report. It may contain   additional important information or changes.****          REBECCA PABON M.D., ATTENDING RADIOLOGIST  This document has been electronically signed. Ernst 10 2019  4:47PM  Addend:  REBECCA PABON M.D., ATTENDING RADIOLOGIST  This addendum was electronically signed on: Ernst 10 2019  5:18PM.        < end of copied text > 58yFemale  Being followed for Epigastric Pain and Elevated LFTs  Interval history: Pt s/p MRI yesterday. Patient states she is doing well. Patient denies nausea, vomiting, abdominal pain, diarrhea, constipation, hematemesis, melena, blood in stool.      PMHX/PSHX:   Chronic Back pain        Social History: No smoking. 2 Glasses of Wine every other weekend alcohol. No illegal drug use.          MEDICATIONS:MEDICATIONS  (STANDING):  heparin  Injectable 5000 Unit(s) SubCutaneous every 8 hours  pantoprazole    Tablet 40 milliGRAM(s) Oral daily  simethicone 80 milliGRAM(s) Chew daily    MEDICATIONS  (PRN):  acetaminophen   Tablet .. 650 milliGRAM(s) Oral every 6 hours PRN Moderate Pain (4 - 6)  oxyCODONE    IR 5 milliGRAM(s) Oral four times a day PRN Severe Pain (7 - 10)        Allergies:    No Known Allergies    Intolerances          REVIEW OF SYSTEMS:  General:  No weight loss, fevers, or chills.  Eyes:  No reported pain or visual changes  ENT:  No sore throat or runny nose.  NECK: No stiffness   CV:  No chest pain or palpitations.  Resp:  No shortness of breath, cough  GI:  No abdominal pain, nausea, vomiting, dysphagia, diarrhea or constipation. No rectal bleeding, melena, or hematemesis.  Neuro:  No tingling, numbness           VITAL SIGNS:   T(F): 97.2 (01-11-19 @ 05:36), Max: 98.3 (01-10-19 @ 17:30)  HR: 63 (01-11-19 @ 05:36) (63 - 66)  BP: 113/55 (01-11-19 @ 05:36) (113/55 - 148/68)  RR: 16 (01-11-19 @ 05:36) (16 - 18)  SpO2: --    PHYSICAL EXAM:  GENERAL: AAOx3, no acute distress.  HEAD:  Atraumatic, Normocephalic  EYES: conjunctiva and sclera clear  NECK: Supple, no JVD or thyromegaly  CHEST/LUNG: Clear to auscultation bilaterally; No wheeze, rhonchi, or rales  HEART: Regular rate and rhythm; normal S1, S2, No murmurs.  ABDOMEN: Soft, nontender, nondistended; Bowel sounds present, no abdominal bruit, masses, or hepatosplenomegaly  NEUROLOGY: No asterixis or tremor.   SKIN: Intact, no jaundice            LABS:                        13.3   4.84  )-----------( 217      ( 11 Jan 2019 09:17 )             41.2     01-11    146  |  107  |  10  ----------------------------<  87  5.0   |  29  |  0.7    Ca    9.8      11 Jan 2019 09:17  Phos  4.3     01-10  Mg     2.3     01-10    TPro  6.6  /  Alb  3.8  /  TBili  0.7  /  DBili  x   /  AST  115<H>  /  ALT  587<H>  /  AlkPhos  268<H>  01-11    LIVER FUNCTIONS - ( 11 Jan 2019 09:17 )  Alb: 3.8 g/dL / Pro: 6.6 g/dL / ALK PHOS: 268 U/L / ALT: 587 U/L / AST: 115 U/L / GGT: x               IMAGING:  < from: MR Abdomen No Cont (01.10.19 @ 16:01) >  EXAM:  MR ABDOMEN          *** ADDENDUM 01/10/2019  ***    Dr. Olivera was made aware of the above findings on January 10, 2019 at   5:15 PM with read back       *** END OF ADDENDUM 01/10/2019  ***        PROCEDURE DATE:  01/10/2019            INTERPRETATION:  Clinical History / Reason for exam: Elevated liver   function tests.    Multiplanar multi sequential MRI of the biliary tree was performed   without contrast and compared to abdominal pelvic CT and ultrasound dated   January 8, 2019.    The common bile duct is dilated to 8 mm and ends abruptly at a prominent   ampulla of Vater measuring 1 cm. An obstructing process is not excluded   and further evaluation with ERCP is recommended. There is a   nonobstructing 3 mm distal common bile duct stone with debris. This is   best seen on image 31 of series 7. The gallbladder is present with few   stones. At the fundus of the gallbladder, there are findings consistent   with adenomyomatosis. There is a tiny hepatic cyst in the left liver.     The pancreas is unremarkable. The pancreatic duct is unremarkable. The   spleen and adrenal glands are unremarkable.    There are bilateral renal cysts. There is no hydronephrosis.     There is no abdominal adenopathy.    Impression    Dilated common bile duct measuring 8 mm and ends abruptly at a prominent   ampulla measuring 1 cm. An obstructing process is not excluded and   further evaluation with ERCP is recommended.    Nonobstructing 3 mm distal common bile duct stone with debris.    Cholelithiasis.          ***Please see the addendum at the top of this report. It may contain   additional important information or changes.****          REBECCA PABON M.D., ATTENDING RADIOLOGIST  This document has been electronically signed. Ernst 10 2019  4:47PM  Addend:  REBECCA PABON M.D., ATTENDING RADIOLOGIST  This addendum was electronically signed on: Ernst 10 2019  5:18PM.        < end of copied text >

## 2019-01-11 NOTE — DISCHARGE NOTE ADULT - PATIENT PORTAL LINK FT
You can access the [facility] Patient Portal, offered by [facility], by registering with the following website: [email or web address]

## 2019-01-11 NOTE — DISCHARGE NOTE ADULT - CARE PROVIDER_API CALL
idalia,   Phone: (   )    -  Fax: (   )    -    Idalia Mitchell  [address] [place] [address]  Phone: [phone]  PCP  Phone: (   )    -  Fax: (   )    -    Jayro Conti), Gastroenterology; Internal Medicine  [address] [place] [address]  Phone: [phone]  Fax: [phone]

## 2019-01-11 NOTE — DISCHARGE NOTE ADULT - CARE PROVIDERS DIRECT ADDRESSES
,DirectAddress_Unknown,DirectAddress_Unknown,[email or web address]

## 2019-01-11 NOTE — DISCHARGE NOTE ADULT - ADDITIONAL INSTRUCTIONS
Please follow-up with you PCP and GI. you already have an appointment scheduled with GI. You will also need to follow-up with surgical team outpatient to have laparoscopic cholecystectomy

## 2019-01-11 NOTE — DISCHARGE NOTE ADULT - MEDICATION SUMMARY - MEDICATIONS TO TAKE
I will START or STAY ON the medications listed below when I get home from the hospital:    pantoprazole 40 mg oral delayed release tablet  -- 1 tab(s) by mouth once a day  -- Indication: For Epigastric pain

## 2019-01-11 NOTE — DISCHARGE NOTE ADULT - PLAN OF CARE
evaluate cause of epigastric pain --> due to Choledocolithiasis Epigastric Pain with transaminitis 2/2 choledocholithiasis. CT abdo/pelvis neg for acute pathology. Abdo US showed cholelithiasis. MRCP showed Dilated common bile duct measuring 8 mm and ends abruptly at a prominent ampulla measuring 1 cm. An obstructing process is not excluded and further evaluation with ERCP is recommended. Pt will f/u with GI as outpt and will need will follow-up with surgical team outpatient to have laparoscopic cholecystectomy

## 2019-01-11 NOTE — DISCHARGE NOTE ADULT - HOSPITAL COURSE
59 yo F with no PMHx presented for epigastric pain x10 days associated with nausea, and NBNB vomiting x1. Epigastric Pain with transaminitis 2/2 choledocholithiasis. CT abdo/pelvis neg for acute pathology. Abdo US shows cholelithiasis. MRCP showed Dilated common bile duct measuring 8 mm and ends abruptly at a prominent ampulla measuring 1 cm. An obstructing process is not excluded and further evaluation with ERCP is recommended. Pt will f/u with GI as outpt and will need will follow-up with surgical team outpatient to have laparoscopic cholecystectomy. Cleared for d/c home per GI.

## 2019-01-15 DIAGNOSIS — K80.50 CALCULUS OF BILE DUCT WITHOUT CHOLANGITIS OR CHOLECYSTITIS WITHOUT OBSTRUCTION: ICD-10-CM

## 2019-01-15 DIAGNOSIS — R74.0 NONSPECIFIC ELEVATION OF LEVELS OF TRANSAMINASE AND LACTIC ACID DEHYDROGENASE [LDH]: ICD-10-CM

## 2019-01-22 ENCOUNTER — OUTPATIENT (OUTPATIENT)
Dept: OUTPATIENT SERVICES | Facility: HOSPITAL | Age: 59
LOS: 1 days | Discharge: HOME | End: 2019-01-22

## 2019-01-22 ENCOUNTER — TRANSCRIPTION ENCOUNTER (OUTPATIENT)
Age: 59
End: 2019-01-22

## 2019-01-22 VITALS — HEART RATE: 67 BPM | SYSTOLIC BLOOD PRESSURE: 107 MMHG | DIASTOLIC BLOOD PRESSURE: 68 MMHG | RESPIRATION RATE: 18 BRPM

## 2019-01-22 VITALS
SYSTOLIC BLOOD PRESSURE: 125 MMHG | DIASTOLIC BLOOD PRESSURE: 60 MMHG | HEART RATE: 59 BPM | TEMPERATURE: 99 F | HEIGHT: 61 IN | WEIGHT: 145.06 LBS | RESPIRATION RATE: 18 BRPM

## 2019-01-22 DIAGNOSIS — Z98.890 OTHER SPECIFIED POSTPROCEDURAL STATES: Chronic | ICD-10-CM

## 2019-01-22 DIAGNOSIS — Z98.891 HISTORY OF UTERINE SCAR FROM PREVIOUS SURGERY: Chronic | ICD-10-CM

## 2019-01-22 RX ORDER — INDOMETHACIN 50 MG
50 CAPSULE ORAL ONCE
Qty: 0 | Refills: 0 | Status: DISCONTINUED | OUTPATIENT
Start: 2019-01-22 | End: 2019-02-06

## 2019-01-22 RX ORDER — ACETAMINOPHEN 500 MG
650 TABLET ORAL ONCE
Qty: 0 | Refills: 0 | Status: COMPLETED | OUTPATIENT
Start: 2019-01-22 | End: 2019-01-22

## 2019-01-22 RX ADMIN — Medication 650 MILLIGRAM(S): at 15:08

## 2019-01-22 NOTE — ASU DISCHARGE PLAN (ADULT/PEDIATRIC). - DISCHARGE PLAN IS COMPLETE AND GIVEN TO PATIENT
See pathology result note orders below:    Result Notes     Notes Recorded by Kalia Duncan MD on 11/1/2017 at 7:54 PM CDT  I spoke to the patient.  His polyps were hyperplastic with the exception of one small tubular adenoma.  I have recommended a : Yes

## 2019-01-22 NOTE — ASU DISCHARGE PLAN (ADULT/PEDIATRIC). - NOTIFY
Excessive Diarrhea/Fever greater than 101/Pain not relieved by Medications/Bleeding that does not stop/Inability to Tolerate Liquids or Foods/Persistent Nausea and Vomiting

## 2019-01-22 NOTE — H&P ADULT - NSHPPHYSICALEXAM_GEN_ALL_CORE
PHYSICAL EXAM:   Vital Signs:  Vital Signs Last 24 Hrs  T(C): 37 (22 Jan 2019 11:21), Max: 37 (22 Jan 2019 10:53)  T(F): 98.6 (22 Jan 2019 10:53), Max: 98.6 (22 Jan 2019 10:53)  HR: 59 (22 Jan 2019 11:21) (59 - 59)  BP: 125/60 (22 Jan 2019 11:21) (125/60 - 125/60)  BP(mean): --  RR: 18 (22 Jan 2019 11:21) (18 - 18)      GENERAL:  Appears stated age, well-groomed, well-nourished, no distress  HEENT:  NC/AT,  conjunctivae clear and pink, no thyromegaly, nodules, adenopathy, no JVD, sclera -anicteric  CHEST:  Full & symmetric excursion, no increased effort, breath sounds clear  HEART:  Regular rhythm, S1, S2, no murmur/rub/S3/S4, no abdominal bruit, no edema  ABDOMEN:  Soft, non-tender, non-distended, normoactive bowel sounds,  no masses ,no hepato-splenomegaly, no signs of chronic liver disease  EXTEREMITIES:  no cyanosis,clubbing or edema  SKIN:  No rash/erythema/ecchymoses/petechiae/wounds/abscess/warm/dry  NEURO:  Alert, oriented, no asterixis, no tremor, no encephalopathy

## 2019-01-22 NOTE — ASU PATIENT PROFILE, ADULT - PSH
H/O foot surgery  left  H/O removal of cyst  back  H/O:   x3  History of endometrial ablation    S/P bilateral breast lumpectomy  history

## 2019-01-28 DIAGNOSIS — K80.50 CALCULUS OF BILE DUCT WITHOUT CHOLANGITIS OR CHOLECYSTITIS WITHOUT OBSTRUCTION: ICD-10-CM

## 2021-02-13 NOTE — DISCHARGE NOTE ADULT - NS MD DC FALL RISK RISK
Urinary burning and frequency for the past couple of days For information on Fall & Injury Prevention, visit [email or web address]

## 2021-02-17 ENCOUNTER — OUTPATIENT (OUTPATIENT)
Dept: OUTPATIENT SERVICES | Facility: HOSPITAL | Age: 61
LOS: 1 days | Discharge: HOME | End: 2021-02-17

## 2021-02-17 DIAGNOSIS — Z98.890 OTHER SPECIFIED POSTPROCEDURAL STATES: Chronic | ICD-10-CM

## 2021-02-17 DIAGNOSIS — Z98.891 HISTORY OF UTERINE SCAR FROM PREVIOUS SURGERY: Chronic | ICD-10-CM

## 2021-02-18 DIAGNOSIS — M81.0 AGE-RELATED OSTEOPOROSIS WITHOUT CURRENT PATHOLOGICAL FRACTURE: ICD-10-CM

## 2021-02-18 DIAGNOSIS — Z13.820 ENCOUNTER FOR SCREENING FOR OSTEOPOROSIS: ICD-10-CM

## 2021-02-18 DIAGNOSIS — Z78.0 ASYMPTOMATIC MENOPAUSAL STATE: ICD-10-CM

## 2023-02-21 PROBLEM — Z00.00 ENCOUNTER FOR PREVENTIVE HEALTH EXAMINATION: Status: ACTIVE | Noted: 2023-02-21

## 2023-02-23 ENCOUNTER — APPOINTMENT (OUTPATIENT)
Dept: PLASTIC SURGERY | Facility: CLINIC | Age: 63
End: 2023-02-23
Payer: COMMERCIAL

## 2023-02-23 VITALS — HEIGHT: 61 IN | WEIGHT: 170 LBS | BODY MASS INDEX: 32.1 KG/M2

## 2023-02-23 DIAGNOSIS — Z78.9 OTHER SPECIFIED HEALTH STATUS: ICD-10-CM

## 2023-02-23 PROCEDURE — 99203 OFFICE O/P NEW LOW 30 MIN: CPT

## 2023-02-23 RX ORDER — MENTHOL/CAMPHOR 0.5 %-0.5%
1000 LOTION (ML) TOPICAL
Refills: 0 | Status: ACTIVE | COMMUNITY

## 2023-02-23 NOTE — HISTORY OF PRESENT ILLNESS
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

## 2023-02-23 NOTE — ASSESSMENT
[FreeTextEntry1] : simple right cheek cyst\par \par Regarding the procedure, we discussed scarring, poor wound healing, bleeding, infection, need for additional surgery, and dissatisfaction with the outcome.  Also discussed possibility of keloid and/or hypertrophic scar formation as well as recurrence.  All questions were answered and risks understood.\par \par Due to COVID 19, pre-visit patient instructions were explained to the patient and their symptoms were checked upon arrival.  \par Masks were used by the health care providers and staff and the examination room was cleaned after the patient visit was completed.\par

## 2023-04-10 RX ORDER — CEPHALEXIN 500 MG/1
500 CAPSULE ORAL 4 TIMES DAILY
Qty: 20 | Refills: 0 | Status: ACTIVE | COMMUNITY
Start: 2023-04-10 | End: 1900-01-01

## 2023-04-14 ENCOUNTER — APPOINTMENT (OUTPATIENT)
Dept: PLASTIC SURGERY | Facility: CLINIC | Age: 63
End: 2023-04-14

## 2023-04-14 ENCOUNTER — APPOINTMENT (OUTPATIENT)
Dept: PLASTIC SURGERY | Facility: CLINIC | Age: 63
End: 2023-04-14
Payer: COMMERCIAL

## 2023-04-14 PROCEDURE — 11444 EXC FACE-MM B9+MARG 3.1-4 CM: CPT

## 2023-04-14 PROCEDURE — 13132 CMPLX RPR F/C/C/M/N/AX/G/H/F: CPT | Mod: 59

## 2023-04-14 NOTE — PROCEDURE
[FreeTextEntry6] : Patient is a 62 year old female with a right cheek cyst measuring approximately 3.5 x 2 cm.  \par \par The area was prepped and draped in the usual fashion.  Local anesthetic was administered using 1% lidocaine with epinephrine.\par \par The cyst was sharply excised.  Area was irrigated copiously.  Complex wound closure was performed in layers.  The wound measured approximately 4 cm.\par \par Sterile dressing applied.  \par \par Patient tolerated procedure well and understands post-op instructions.\par \par Sutures Used: 3-0 monocryl, 6-0 prolene\par \par

## 2023-04-21 ENCOUNTER — APPOINTMENT (OUTPATIENT)
Dept: PLASTIC SURGERY | Facility: CLINIC | Age: 63
End: 2023-04-21
Payer: COMMERCIAL

## 2023-04-21 DIAGNOSIS — L72.0 EPIDERMAL CYST: ICD-10-CM

## 2023-04-21 PROCEDURE — 99024 POSTOP FOLLOW-UP VISIT: CPT

## 2023-04-21 NOTE — PHYSICAL EXAM
[NI] : Normal [de-identified] : rigth cheek  incision is CDI, no surrounding cellulitis or erythema. No open areas or drainage. Facial animation intact

## 2023-04-21 NOTE — HISTORY OF PRESENT ILLNESS
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 Doing very well, denies fever/chills/drainage. \par

## 2023-04-24 LAB — CORE LAB BIOPSY: NORMAL

## 2024-07-10 ENCOUNTER — OUTPATIENT (OUTPATIENT)
Dept: OUTPATIENT SERVICES | Facility: HOSPITAL | Age: 64
LOS: 1 days | End: 2024-07-10
Payer: COMMERCIAL

## 2024-07-10 DIAGNOSIS — Z98.890 OTHER SPECIFIED POSTPROCEDURAL STATES: Chronic | ICD-10-CM

## 2024-07-10 DIAGNOSIS — Z12.31 ENCOUNTER FOR SCREENING MAMMOGRAM FOR MALIGNANT NEOPLASM OF BREAST: ICD-10-CM

## 2024-07-10 DIAGNOSIS — Z98.891 HISTORY OF UTERINE SCAR FROM PREVIOUS SURGERY: Chronic | ICD-10-CM

## 2024-07-10 PROCEDURE — 77063 BREAST TOMOSYNTHESIS BI: CPT

## 2024-07-10 PROCEDURE — 77067 SCR MAMMO BI INCL CAD: CPT

## 2024-07-10 PROCEDURE — 77063 BREAST TOMOSYNTHESIS BI: CPT | Mod: 26

## 2024-07-10 PROCEDURE — 77067 SCR MAMMO BI INCL CAD: CPT | Mod: 26

## 2024-07-11 DIAGNOSIS — Z12.31 ENCOUNTER FOR SCREENING MAMMOGRAM FOR MALIGNANT NEOPLASM OF BREAST: ICD-10-CM

## 2024-08-28 NOTE — ASU PATIENT PROFILE, ADULT - ACCEPTABLE
In an effort to ensure that our patients [employer], a Team Member has reviewed your chart and identified an opportunity to provide the best care possible. An attempt was made to discuss or schedule due or overdue Preventive or Chronic Condition care.    The Outcome was Contact was made, care gap was discussed - see further documentation. Care Gaps identified: Colorectal Cancer Screening.    Appointment needed: Follow-up Visit    Spoke with patient and informed due for Colonoscopy and to verify if IFOB kit was received per patient unsure stated she was not home to check, informed her I will send her a new one as per pt address on file not correct. Patient wanted information mailed to   [address] [place] [address]      0